# Patient Record
Sex: MALE | Race: WHITE | NOT HISPANIC OR LATINO | URBAN - METROPOLITAN AREA
[De-identification: names, ages, dates, MRNs, and addresses within clinical notes are randomized per-mention and may not be internally consistent; named-entity substitution may affect disease eponyms.]

---

## 2024-09-09 ENCOUNTER — EMERGENCY (EMERGENCY)
Facility: HOSPITAL | Age: 79
LOS: 1 days | Discharge: HOME | End: 2024-09-09
Attending: EMERGENCY MEDICINE | Admitting: STUDENT IN AN ORGANIZED HEALTH CARE EDUCATION/TRAINING PROGRAM
Payer: MEDICARE

## 2024-09-09 VITALS
TEMPERATURE: 98 F | RESPIRATION RATE: 20 BRPM | HEIGHT: 67 IN | HEART RATE: 113 BPM | OXYGEN SATURATION: 94 % | SYSTOLIC BLOOD PRESSURE: 136 MMHG | WEIGHT: 179.02 LBS | DIASTOLIC BLOOD PRESSURE: 85 MMHG

## 2024-09-09 VITALS
DIASTOLIC BLOOD PRESSURE: 82 MMHG | SYSTOLIC BLOOD PRESSURE: 166 MMHG | RESPIRATION RATE: 20 BRPM | HEART RATE: 72 BPM | OXYGEN SATURATION: 96 % | TEMPERATURE: 98 F

## 2024-09-09 DIAGNOSIS — I48.91 UNSPECIFIED ATRIAL FIBRILLATION: ICD-10-CM

## 2024-09-09 LAB
ALBUMIN SERPL ELPH-MCNC: 3.3 G/DL — SIGNIFICANT CHANGE UP (ref 3.3–5)
ALP SERPL-CCNC: 62 U/L — SIGNIFICANT CHANGE UP (ref 40–120)
ALT FLD-CCNC: 17 U/L — SIGNIFICANT CHANGE UP (ref 12–78)
ANION GAP SERPL CALC-SCNC: 6 MMOL/L — SIGNIFICANT CHANGE UP (ref 5–17)
APTT BLD: 33 SEC — SIGNIFICANT CHANGE UP (ref 24.5–35.6)
AST SERPL-CCNC: 21 U/L — SIGNIFICANT CHANGE UP (ref 15–37)
BASOPHILS # BLD AUTO: 0.03 K/UL — SIGNIFICANT CHANGE UP (ref 0–0.2)
BASOPHILS NFR BLD AUTO: 0.6 % — SIGNIFICANT CHANGE UP (ref 0–2)
BILIRUB SERPL-MCNC: 0.5 MG/DL — SIGNIFICANT CHANGE UP (ref 0.2–1.2)
BUN SERPL-MCNC: 12 MG/DL — SIGNIFICANT CHANGE UP (ref 7–23)
CALCIUM SERPL-MCNC: 8.9 MG/DL — SIGNIFICANT CHANGE UP (ref 8.5–10.1)
CHLORIDE SERPL-SCNC: 110 MMOL/L — HIGH (ref 96–108)
CO2 SERPL-SCNC: 25 MMOL/L — SIGNIFICANT CHANGE UP (ref 22–31)
CREAT SERPL-MCNC: 1 MG/DL — SIGNIFICANT CHANGE UP (ref 0.5–1.3)
D DIMER BLD IA.RAPID-MCNC: <150 NG/ML DDU — SIGNIFICANT CHANGE UP
EGFR: 76 ML/MIN/1.73M2 — SIGNIFICANT CHANGE UP
EOSINOPHIL # BLD AUTO: 0.22 K/UL — SIGNIFICANT CHANGE UP (ref 0–0.5)
EOSINOPHIL NFR BLD AUTO: 4.3 % — SIGNIFICANT CHANGE UP (ref 0–6)
GLUCOSE SERPL-MCNC: 113 MG/DL — HIGH (ref 70–99)
HCT VFR BLD CALC: 42.2 % — SIGNIFICANT CHANGE UP (ref 39–50)
HGB BLD-MCNC: 14.1 G/DL — SIGNIFICANT CHANGE UP (ref 13–17)
IMM GRANULOCYTES NFR BLD AUTO: 0.2 % — SIGNIFICANT CHANGE UP (ref 0–0.9)
INR BLD: 1.05 RATIO — SIGNIFICANT CHANGE UP (ref 0.85–1.18)
LIDOCAIN IGE QN: 15 U/L — SIGNIFICANT CHANGE UP (ref 13–75)
LYMPHOCYTES # BLD AUTO: 1.72 K/UL — SIGNIFICANT CHANGE UP (ref 1–3.3)
LYMPHOCYTES # BLD AUTO: 33.9 % — SIGNIFICANT CHANGE UP (ref 13–44)
MCHC RBC-ENTMCNC: 32.3 PG — SIGNIFICANT CHANGE UP (ref 27–34)
MCHC RBC-ENTMCNC: 33.4 GM/DL — SIGNIFICANT CHANGE UP (ref 32–36)
MCV RBC AUTO: 96.8 FL — SIGNIFICANT CHANGE UP (ref 80–100)
MONOCYTES # BLD AUTO: 0.39 K/UL — SIGNIFICANT CHANGE UP (ref 0–0.9)
MONOCYTES NFR BLD AUTO: 7.7 % — SIGNIFICANT CHANGE UP (ref 2–14)
NEUTROPHILS # BLD AUTO: 2.71 K/UL — SIGNIFICANT CHANGE UP (ref 1.8–7.4)
NEUTROPHILS NFR BLD AUTO: 53.3 % — SIGNIFICANT CHANGE UP (ref 43–77)
NRBC # BLD: 0 /100 WBCS — SIGNIFICANT CHANGE UP (ref 0–0)
NT-PROBNP SERPL-SCNC: 185 PG/ML — SIGNIFICANT CHANGE UP (ref 0–450)
PLATELET # BLD AUTO: 163 K/UL — SIGNIFICANT CHANGE UP (ref 150–400)
POTASSIUM SERPL-MCNC: 4 MMOL/L — SIGNIFICANT CHANGE UP (ref 3.5–5.3)
POTASSIUM SERPL-SCNC: 4 MMOL/L — SIGNIFICANT CHANGE UP (ref 3.5–5.3)
PROT SERPL-MCNC: 6.7 G/DL — SIGNIFICANT CHANGE UP (ref 6–8.3)
PROTHROM AB SERPL-ACNC: 12 SEC — SIGNIFICANT CHANGE UP (ref 9.5–13)
RBC # BLD: 4.36 M/UL — SIGNIFICANT CHANGE UP (ref 4.2–5.8)
RBC # FLD: 12.4 % — SIGNIFICANT CHANGE UP (ref 10.3–14.5)
SODIUM SERPL-SCNC: 141 MMOL/L — SIGNIFICANT CHANGE UP (ref 135–145)
TROPONIN I, HIGH SENSITIVITY RESULT: 11.1 NG/L — SIGNIFICANT CHANGE UP
TROPONIN I, HIGH SENSITIVITY RESULT: 24.9 NG/L — SIGNIFICANT CHANGE UP
TROPONIN I, HIGH SENSITIVITY RESULT: 30.4 NG/L — SIGNIFICANT CHANGE UP
WBC # BLD: 5.08 K/UL — SIGNIFICANT CHANGE UP (ref 3.8–10.5)
WBC # FLD AUTO: 5.08 K/UL — SIGNIFICANT CHANGE UP (ref 3.8–10.5)

## 2024-09-09 PROCEDURE — 85730 THROMBOPLASTIN TIME PARTIAL: CPT

## 2024-09-09 PROCEDURE — 83880 ASSAY OF NATRIURETIC PEPTIDE: CPT

## 2024-09-09 PROCEDURE — 85025 COMPLETE CBC W/AUTO DIFF WBC: CPT

## 2024-09-09 PROCEDURE — 85610 PROTHROMBIN TIME: CPT

## 2024-09-09 PROCEDURE — 99285 EMERGENCY DEPT VISIT HI MDM: CPT

## 2024-09-09 PROCEDURE — 71045 X-RAY EXAM CHEST 1 VIEW: CPT

## 2024-09-09 PROCEDURE — 80053 COMPREHEN METABOLIC PANEL: CPT

## 2024-09-09 PROCEDURE — 99285 EMERGENCY DEPT VISIT HI MDM: CPT | Mod: 25

## 2024-09-09 PROCEDURE — 71045 X-RAY EXAM CHEST 1 VIEW: CPT | Mod: 26

## 2024-09-09 PROCEDURE — 93005 ELECTROCARDIOGRAM TRACING: CPT

## 2024-09-09 PROCEDURE — 83690 ASSAY OF LIPASE: CPT

## 2024-09-09 PROCEDURE — 36415 COLL VENOUS BLD VENIPUNCTURE: CPT

## 2024-09-09 PROCEDURE — 85379 FIBRIN DEGRADATION QUANT: CPT

## 2024-09-09 PROCEDURE — 93010 ELECTROCARDIOGRAM REPORT: CPT

## 2024-09-09 PROCEDURE — 84484 ASSAY OF TROPONIN QUANT: CPT

## 2024-09-09 RX ORDER — METOPROLOL TARTRATE 100 MG/1
1 TABLET ORAL
Qty: 30 | Refills: 0
Start: 2024-09-09 | End: 2024-10-08

## 2024-09-09 NOTE — CONSULT NOTE ADULT - SUBJECTIVE AND OBJECTIVE BOX
Patient is a 79y old  Male who presents with a chief complaint of     HPI:      PAST MEDICAL & SURGICAL HISTORY:            ECHO  FINDINGS:      MEDICATIONS  (STANDING):    MEDICATIONS  (PRN):      FAMILY HISTORY:    Denies Family history of CAD or early MI      Constitutional: denies fever, chills  HEENT: denies blurry vision, difficulty hearing  Respiratory: denies SOB, MAN, cough  Cardiovascular: denies CP, palpitations, orthopnea, PND, LE edema  Gastrointestinal: denies nausea, vomiting, abdominal pain  Genitourinary: denies urinary changes  Skin: Denies rashes, itching  Neurologic: denies headache, weakness, dizziness  Hematology/Oncology: denies bleeding, easy bruising  ROS negative except as noted above      SOCIAL HISTORY:    No tobacco, Alcohol or Ddrug use    Vital Signs Last 24 Hrs  T(C): 36.6 (09 Sep 2024 03:13), Max: 36.6 (09 Sep 2024 03:13)  T(F): 97.8 (09 Sep 2024 03:13), Max: 97.8 (09 Sep 2024 03:13)  HR: 79 (09 Sep 2024 06:41) (79 - 113)  BP: 151/83 (09 Sep 2024 06:41) (136/85 - 151/83)  BP(mean): --  RR: 18 (09 Sep 2024 06:41) (18 - 20)  SpO2: 97% (09 Sep 2024 06:41) (94% - 97%)    Parameters below as of 09 Sep 2024 06:41  Patient On (Oxygen Delivery Method): room air        Physical Exam:  General: Well developed, well nourished, NAD  HEENT: NCAT, PERRLA, EOMI bl, moist mucous membranes   Neck: Supple, nontender, no mass  Neurology: A&Ox3, nonfocal, sensation intact   Respiratory: CTA B/L, No W/R/R  CV: RRR, +S1/S2, no murmurs, rubs or gallops  Abdominal: Soft, NT, ND +BSx4, no palpable masses  Extremities: No C/C/E, + peripheral pulses  MSK: Normal ROM, no joint erythema or warmth, no joint swelling   Heme: No obvious ecchymosis or petechiae   Skin: warm, dry, normal color      ECG:    I&O's Detail      LABS:                        14.1   5.08  )-----------( 163      ( 09 Sep 2024 03:35 )             42.2     09-09    141  |  110<H>  |  12  ----------------------------<  113<H>  4.0   |  25  |  1.00    Ca    8.9      09 Sep 2024 03:35    TPro  6.7  /  Alb  3.3  /  TBili  0.5  /  DBili  x   /  AST  21  /  ALT  17  /  AlkPhos  62  09-09        PT/INR - ( 09 Sep 2024 03:35 )   PT: 12.0 sec;   INR: 1.05 ratio         PTT - ( 09 Sep 2024 03:35 )  PTT:33.0 sec  Urinalysis Basic - ( 09 Sep 2024 03:35 )    Color: x / Appearance: x / SG: x / pH: x  Gluc: 113 mg/dL / Ketone: x  / Bili: x / Urobili: x   Blood: x / Protein: x / Nitrite: x   Leuk Esterase: x / RBC: x / WBC x   Sq Epi: x / Non Sq Epi: x / Bacteria: x      I&O's Summary    BNP  RADIOLOGY & ADDITIONAL STUDIES: HPI: Patient is a 79y old Male with a PMH of HTN, Anxiety, BPH, Parkinson's disease who presented due to an episode of non-radiating chest pressure. He states he was sleeping and was woken up with a sensation of weight on his chest. He denied associated nausea or dizziness. Pt reported that 10 minutes after the onset of his symptoms, he decided to take a dose of Diazepam for relief. He states that within the next hour, his chest pressure had resolved and has not recurred since. Of note, pt had an unprovoked PE in 2019 and was put on Xarelto for AC. Since then, he follows up with his Cardiologist Dr. Overton at [facility] every 6 months. He states he had an EKG and ECHO within the last 6 months which were normal as per pt.       PAST MEDICAL HISTORY:  Hypertension  Anxiety  BPH  Parkinson's disease  Pulmonary Embolism          ECHO  FINDINGS: Prior Echo was within the last 6 months, was normal as per pt.      HOME MEDICATIONS: Outpatient medication list not yet confirmed  Xarelto   Flomax  Benzapril    REVIEW OF SYSTEMS  Constitutional: denies fever, chills  Respiratory: denies SOB, MAN, cough  Cardiovascular: denies CP, palpitations  Gastrointestinal: denies nausea, vomiting, abdominal pain  Neurologic: denies headache, weakness, dizziness  ROS negative except as noted above      SOCIAL HISTORY:  Unknown if ever smoked    Vital Signs Last 24 Hrs  T(C): 36.6 (09 Sep 2024 03:13), Max: 36.6 (09 Sep 2024 03:13)  T(F): 97.8 (09 Sep 2024 03:13), Max: 97.8 (09 Sep 2024 03:13)  HR: 79 (09 Sep 2024 06:41) (79 - 113)  BP: 151/83 (09 Sep 2024 06:41) (136/85 - 151/83)  BP(mean): --  RR: 18 (09 Sep 2024 06:41) (18 - 20)  SpO2: 97% (09 Sep 2024 06:41) (94% - 97%)    Parameters below as of 09 Sep 2024 06:41  Patient On (Oxygen Delivery Method): room air        Physical Exam:  General: Well developed, well nourished, NAD  Neurology: A&Ox3, nonfocal, sensation intact   Respiratory: CTA B/L, No W/R/R  CV: RRR, +S1/S2, no murmurs, rubs or gallops  Abdominal: Soft, NT, ND +BSx4, no palpable masses  Extremities: +Mild chronic LLE edema, no calf tenderness + peripheral pulses  MSK: Normal ROM  Heme: No obvious ecchymosis or petechiae   Skin: warm, dry, normal color      EC-SEP-2024 03:05:05  Atrial fibrillation with rapid ventricular response  Left axis deviation  Nonspecific ST abnormality  Abnormal ECG  No previous ECGs available  Vent. rate 119 BPM    09-SEP-2024 03:32:23  Normal sinus rhythm  Left axis deviation  Abnormal ECG   When compared with ECG of 09-SEP-2024 03:05, (Unconfirmed) Sinus rhythm has replaced Atrial fibrillation  Vent. rate has decreased BY 44 BPM      LABS:                        14.1   5.08  )-----------( 163      ( 09 Sep 2024 03:35 )             42.2         141  |  110<H>  |  12  ----------------------------<  113<H>  4.0   |  25  |  1.00    Ca    8.9      09 Sep 2024 03:35    TPro  6.7  /  Alb  3.3  /  TBili  0.5  /  DBili  x   /  AST  21  /  ALT  17  /  AlkPhos  62          PT/INR - ( 09 Sep 2024 03:35 )   PT: 12.0 sec;   INR: 1.05 ratio         PTT - ( 09 Sep 2024 03:35 )  PTT:33.0 sec  Urinalysis Basic - ( 09 Sep 2024 03:35 )    Color: x / Appearance: x / SG: x / pH: x  Gluc: 113 mg/dL / Ketone: x  / Bili: x / Urobili: x   Blood: x / Protein: x / Nitrite: x   Leuk Esterase: x / RBC: x / WBC x   Sq Epi: x / Non Sq Epi: x / Bacteria: x

## 2024-09-09 NOTE — ED PROVIDER NOTE - CLINICAL SUMMARY MEDICAL DECISION MAKING FREE TEXT BOX
79-year-old male history of hypertension on benazepril PE on Xarelto BPH on Flomax complaining of waking up around 2 AM with chest pressure nonradiating.  Initially moderate but now almost resolved.  Denies any shortness of breath.  Denies any fever chills upper respiratory symptoms.    r/o arrythmia, r/o acs, labs, ekg, XR, troponin, initial ekg showing afib, now back to NSR, cards evaluation in AM

## 2024-09-09 NOTE — ED PROVIDER NOTE - PATIENT PORTAL LINK FT
You can access the FollowMyHealth Patient Portal offered by [facility] by registering at the following website: [email or web address]. By joining [employer]’s FollowMyHealth portal, you will also be able to view your health information using other applications (apps) compatible with our system.

## 2024-09-09 NOTE — ED ADULT NURSE NOTE - NSFALLUNIVINTERV_ED_ALL_ED
Bed/Stretcher in lowest position, wheels locked, appropriate side rails in place/Call bell, personal items and telephone in reach/Instruct patient to call for assistance before getting out of bed/chair/stretcher/Non-slip footwear applied when patient is off stretcher/[address] to call system/Physically safe environment - no spills, clutter or unnecessary equipment/Purposeful proactive rounding/Room/bathroom lighting operational, light cord in reach

## 2024-09-09 NOTE — ED ADULT NURSE REASSESSMENT NOTE - NS ED NURSE REASSESS COMMENT FT1
received report from night RN, received pt in room 14B, A+OX4 pt denies feelings of palpations, pt is NSR on cardiac monitor. VSS, RR even and unlabored. pt is pending cardiology consult.

## 2024-09-09 NOTE — ED ADULT TRIAGE NOTE - CHIEF COMPLAINT QUOTE
pt. came in from home, ambulatory, w/ c/o of chest pressure started at 0200H, took Valium at 0230H, known HTN , PE, on Xeralto, alert and oriented, directly brought to Trauma 2, EKG done.

## 2024-09-09 NOTE — CONSULT NOTE ADULT - ASSESSMENT
Patient is a 79y old Male with a PMH of HTN, Anxiety, BPH, Parkinson's disease who presented due to an episode of non-radiating chest pressure likely 2/2 atrial fibrillation.

## 2024-09-09 NOTE — ED PROVIDER NOTE - NSFOLLOWUPINSTRUCTIONS_ED_ALL_ED_FT
1. TAKE ALL MEDICATIONS AS DIRECTED.    2. FOR PAIN OR FEVER YOU CAN TAKE ACETAMINOPHEN (TYLENOL) AS NEEDED, AS DIRECTED ON PACKAGING.  3. FOLLOW UP WITH YOUR PRIMARY DOCTOR WITHIN 5 DAYS AS DIRECTED.  4. IF YOU HAD LABS OR IMAGING DONE, YOU WERE GIVEN COPIES OF ALL LABS AND/OR IMAGING RESULTS FROM YOUR ER VISIT--PLEASE TAKE THEM WITH YOU TO YOUR FOLLOW UP APPOINTMENTS.  5. IF NEEDED, CALL PATIENT ACCESS SERVICES AT [phone] TO FIND A PRIMARY CARE PHYSICIAN.  OR CALL [phone] TO MAKE AN APPOINTMENT WITH THE CLINIC.  6. RETURN TO THE ER FOR ANY WORSENING SYMPTOMS OR CONCERNS.    Take toprol once a day as directed  COntinue with your xarelto  Follo wujose with your cardiologist    Atrial Fibrillation    Atrial fibrillation is a type of irregular heartbeat (arrhythmia) where the heart quivers continuously in a chaotic pattern that makes the heart unable to pump blood normally. This can increase the risk for stroke, heart failure, and other heart-related conditions. Atrial fibrillation can be caused by a variety of conditions and may be temporary, intermittent, or permanent. Symptoms include feeling that your heart is beating rapidly or irregularly, chest discomfort, shortness of breath, or dizziness/lightheadedness that may be worse with exertion. Treatment is varied but may involve medication or electrical shock (cardioversion).    SEEK IMMEDIATE MEDICAL CARE IF YOU HAVE ANY OF THE FOLLOWING SYMPTOMS: chest pain, shortness of breath, abdominal pain, sweating, vomiting, blood in vomit/bowel movements/urine, dizziness/lightheadedness, weakness or numbness to face/arm/leg, trouble speaking or understanding, facial droop.

## 2024-09-09 NOTE — CONSULT NOTE ADULT - ATTENDING COMMENTS
79y old Male with a PMH of HTN, Anxiety, BPH, Parkinson's disease who presented due to an episode of non-radiating chest pressure.    - found to be in af on presentation (likely stress, antwan, insomnia driven), though has converted back to sr  - no sign of acute ischemia and all troponins are negative (x 3)  - has been on ac for vte in the past, and would continue  - start toprol xl 25  - no sign of volume overload  - no issue with dc home, now that he is feeling well and in sr  - he will fu with his outpatient cardiologist.

## 2024-09-09 NOTE — ED PROVIDER NOTE - OBJECTIVE STATEMENT
79-year-old male history of hypertension on benazepril PE on Xarelto BPH on Flomax complaining of waking up around 2 AM with chest pressure nonradiating.  Initially moderate but now almost resolved.  Denies any shortness of breath.  Denies any fever chills upper respiratory symptoms.

## 2024-09-09 NOTE — CONSULT NOTE ADULT - PROBLEM SELECTOR RECOMMENDATION 9
Pt presented with an episode of chest pressure. EKG on admission showed atrial fibrillation with RVR and HR of 119 BPM.  - Pt currently on Xarelto after PE in 2019. Continue for AC.  - Start Metoprolol for HTN and management of Afib.   - Repeat EKG showed return to NSR  - Troponin normal x2 but slightly uptrending, f/u on one more set of troponins. Pt presented with an episode of chest pressure. EKG on admission showed atrial fibrillation with RVR and HR of 119 BPM.  - Pt currently on Xarelto after PE in 2019. Continue for AC.  - Start Metoprolol xl 25  for HTN and management of Afib.   - Repeat EKG showed return to NSR  - Troponin normal x2 but slightly uptrending, f/u on one more set of troponins.  - ekg in sr without ischemia